# Patient Record
Sex: MALE | Race: ASIAN | NOT HISPANIC OR LATINO | URBAN - METROPOLITAN AREA
[De-identification: names, ages, dates, MRNs, and addresses within clinical notes are randomized per-mention and may not be internally consistent; named-entity substitution may affect disease eponyms.]

---

## 2021-04-09 ENCOUNTER — EMERGENCY (EMERGENCY)
Facility: HOSPITAL | Age: 56
LOS: 1 days | Discharge: HOME | End: 2021-04-09
Attending: EMERGENCY MEDICINE | Admitting: EMERGENCY MEDICINE
Payer: MEDICAID

## 2021-04-09 VITALS
SYSTOLIC BLOOD PRESSURE: 149 MMHG | DIASTOLIC BLOOD PRESSURE: 106 MMHG | TEMPERATURE: 98 F | HEART RATE: 109 BPM | OXYGEN SATURATION: 100 % | RESPIRATION RATE: 16 BRPM

## 2021-04-09 VITALS
OXYGEN SATURATION: 100 % | SYSTOLIC BLOOD PRESSURE: 133 MMHG | HEART RATE: 80 BPM | DIASTOLIC BLOOD PRESSURE: 88 MMHG | RESPIRATION RATE: 16 BRPM

## 2021-04-09 LAB
ALBUMIN SERPL ELPH-MCNC: 4.8 G/DL — SIGNIFICANT CHANGE UP (ref 3.3–5)
ALP SERPL-CCNC: 76 U/L — SIGNIFICANT CHANGE UP (ref 40–120)
ALT FLD-CCNC: 33 U/L — SIGNIFICANT CHANGE UP (ref 4–41)
ANION GAP SERPL CALC-SCNC: 13 MMOL/L — SIGNIFICANT CHANGE UP (ref 7–14)
APPEARANCE UR: CLEAR — SIGNIFICANT CHANGE UP
APTT BLD: 34.5 SEC — SIGNIFICANT CHANGE UP (ref 27–36.3)
AST SERPL-CCNC: 25 U/L — SIGNIFICANT CHANGE UP (ref 4–40)
BACTERIA # UR AUTO: NEGATIVE — SIGNIFICANT CHANGE UP
BASOPHILS # BLD AUTO: 0.05 K/UL — SIGNIFICANT CHANGE UP (ref 0–0.2)
BASOPHILS NFR BLD AUTO: 0.4 % — SIGNIFICANT CHANGE UP (ref 0–2)
BILIRUB SERPL-MCNC: 0.4 MG/DL — SIGNIFICANT CHANGE UP (ref 0.2–1.2)
BILIRUB UR-MCNC: NEGATIVE — SIGNIFICANT CHANGE UP
BUN SERPL-MCNC: 14 MG/DL — SIGNIFICANT CHANGE UP (ref 7–23)
CALCIUM SERPL-MCNC: 9.9 MG/DL — SIGNIFICANT CHANGE UP (ref 8.4–10.5)
CHLORIDE SERPL-SCNC: 103 MMOL/L — SIGNIFICANT CHANGE UP (ref 98–107)
CO2 SERPL-SCNC: 21 MMOL/L — LOW (ref 22–31)
COLOR SPEC: YELLOW — SIGNIFICANT CHANGE UP
CREAT SERPL-MCNC: 0.9 MG/DL — SIGNIFICANT CHANGE UP (ref 0.5–1.3)
DIFF PNL FLD: NEGATIVE — SIGNIFICANT CHANGE UP
EOSINOPHIL # BLD AUTO: 0.17 K/UL — SIGNIFICANT CHANGE UP (ref 0–0.5)
EOSINOPHIL NFR BLD AUTO: 1.3 % — SIGNIFICANT CHANGE UP (ref 0–6)
EPI CELLS # UR: 0 /HPF — SIGNIFICANT CHANGE UP (ref 0–5)
GLUCOSE SERPL-MCNC: 102 MG/DL — HIGH (ref 70–99)
GLUCOSE UR QL: NEGATIVE — SIGNIFICANT CHANGE UP
HCT VFR BLD CALC: 47.6 % — SIGNIFICANT CHANGE UP (ref 39–50)
HGB BLD-MCNC: 15.9 G/DL — SIGNIFICANT CHANGE UP (ref 13–17)
HYALINE CASTS # UR AUTO: 1 /LPF — SIGNIFICANT CHANGE UP (ref 0–7)
IANC: 8.97 K/UL — HIGH (ref 1.5–8.5)
IMM GRANULOCYTES NFR BLD AUTO: 0.4 % — SIGNIFICANT CHANGE UP (ref 0–1.5)
INR BLD: 0.95 RATIO — SIGNIFICANT CHANGE UP (ref 0.88–1.16)
KETONES UR-MCNC: NEGATIVE — SIGNIFICANT CHANGE UP
LEUKOCYTE ESTERASE UR-ACNC: NEGATIVE — SIGNIFICANT CHANGE UP
LYMPHOCYTES # BLD AUTO: 2.89 K/UL — SIGNIFICANT CHANGE UP (ref 1–3.3)
LYMPHOCYTES # BLD AUTO: 22.5 % — SIGNIFICANT CHANGE UP (ref 13–44)
MCHC RBC-ENTMCNC: 26.8 PG — LOW (ref 27–34)
MCHC RBC-ENTMCNC: 33.4 GM/DL — SIGNIFICANT CHANGE UP (ref 32–36)
MCV RBC AUTO: 80.1 FL — SIGNIFICANT CHANGE UP (ref 80–100)
MONOCYTES # BLD AUTO: 0.73 K/UL — SIGNIFICANT CHANGE UP (ref 0–0.9)
MONOCYTES NFR BLD AUTO: 5.7 % — SIGNIFICANT CHANGE UP (ref 2–14)
NEUTROPHILS # BLD AUTO: 8.97 K/UL — HIGH (ref 1.8–7.4)
NEUTROPHILS NFR BLD AUTO: 69.7 % — SIGNIFICANT CHANGE UP (ref 43–77)
NITRITE UR-MCNC: NEGATIVE — SIGNIFICANT CHANGE UP
NRBC # BLD: 0 /100 WBCS — SIGNIFICANT CHANGE UP
NRBC # FLD: 0 K/UL — SIGNIFICANT CHANGE UP
PH UR: 6 — SIGNIFICANT CHANGE UP (ref 5–8)
PLATELET # BLD AUTO: 342 K/UL — SIGNIFICANT CHANGE UP (ref 150–400)
POTASSIUM SERPL-MCNC: 4 MMOL/L — SIGNIFICANT CHANGE UP (ref 3.5–5.3)
POTASSIUM SERPL-SCNC: 4 MMOL/L — SIGNIFICANT CHANGE UP (ref 3.5–5.3)
PROT SERPL-MCNC: 8.2 G/DL — SIGNIFICANT CHANGE UP (ref 6–8.3)
PROT UR-MCNC: ABNORMAL
PROTHROM AB SERPL-ACNC: 11 SEC — SIGNIFICANT CHANGE UP (ref 10.6–13.6)
RBC # BLD: 5.94 M/UL — HIGH (ref 4.2–5.8)
RBC # FLD: 13.2 % — SIGNIFICANT CHANGE UP (ref 10.3–14.5)
RBC CASTS # UR COMP ASSIST: 1 /HPF — SIGNIFICANT CHANGE UP (ref 0–4)
SODIUM SERPL-SCNC: 137 MMOL/L — SIGNIFICANT CHANGE UP (ref 135–145)
SP GR SPEC: 1.02 — SIGNIFICANT CHANGE UP (ref 1.01–1.02)
UROBILINOGEN FLD QL: SIGNIFICANT CHANGE UP
WBC # BLD: 12.86 K/UL — HIGH (ref 3.8–10.5)
WBC # FLD AUTO: 12.86 K/UL — HIGH (ref 3.8–10.5)
WBC UR QL: 1 /HPF — SIGNIFICANT CHANGE UP (ref 0–5)

## 2021-04-09 PROCEDURE — 99285 EMERGENCY DEPT VISIT HI MDM: CPT | Mod: 25

## 2021-04-09 PROCEDURE — 72125 CT NECK SPINE W/O DYE: CPT | Mod: 26

## 2021-04-09 PROCEDURE — 70450 CT HEAD/BRAIN W/O DYE: CPT | Mod: 26

## 2021-04-09 PROCEDURE — 74177 CT ABD & PELVIS W/CONTRAST: CPT | Mod: 26

## 2021-04-09 PROCEDURE — 71260 CT THORAX DX C+: CPT | Mod: 26

## 2021-04-09 PROCEDURE — 93010 ELECTROCARDIOGRAM REPORT: CPT

## 2021-04-09 RX ORDER — CYCLOBENZAPRINE HYDROCHLORIDE 10 MG/1
5 TABLET, FILM COATED ORAL ONCE
Refills: 0 | Status: COMPLETED | OUTPATIENT
Start: 2021-04-09 | End: 2021-04-09

## 2021-04-09 RX ORDER — MORPHINE SULFATE 50 MG/1
4 CAPSULE, EXTENDED RELEASE ORAL ONCE
Refills: 0 | Status: DISCONTINUED | OUTPATIENT
Start: 2021-04-09 | End: 2021-04-09

## 2021-04-09 RX ORDER — SODIUM CHLORIDE 9 MG/ML
1000 INJECTION INTRAMUSCULAR; INTRAVENOUS; SUBCUTANEOUS ONCE
Refills: 0 | Status: COMPLETED | OUTPATIENT
Start: 2021-04-09 | End: 2021-04-09

## 2021-04-09 RX ORDER — IBUPROFEN 200 MG
1 TABLET ORAL
Qty: 20 | Refills: 0
Start: 2021-04-09

## 2021-04-09 RX ORDER — KETOROLAC TROMETHAMINE 30 MG/ML
15 SYRINGE (ML) INJECTION ONCE
Refills: 0 | Status: DISCONTINUED | OUTPATIENT
Start: 2021-04-09 | End: 2021-04-09

## 2021-04-09 RX ORDER — CYCLOBENZAPRINE HYDROCHLORIDE 10 MG/1
1 TABLET, FILM COATED ORAL
Qty: 12 | Refills: 0
Start: 2021-04-09

## 2021-04-09 RX ORDER — LIDOCAINE 4 G/100G
1 CREAM TOPICAL ONCE
Refills: 0 | Status: COMPLETED | OUTPATIENT
Start: 2021-04-09 | End: 2021-04-09

## 2021-04-09 RX ADMIN — MORPHINE SULFATE 4 MILLIGRAM(S): 50 CAPSULE, EXTENDED RELEASE ORAL at 13:12

## 2021-04-09 RX ADMIN — CYCLOBENZAPRINE HYDROCHLORIDE 5 MILLIGRAM(S): 10 TABLET, FILM COATED ORAL at 15:04

## 2021-04-09 RX ADMIN — Medication 15 MILLIGRAM(S): at 15:05

## 2021-04-09 RX ADMIN — SODIUM CHLORIDE 1000 MILLILITER(S): 9 INJECTION INTRAMUSCULAR; INTRAVENOUS; SUBCUTANEOUS at 13:12

## 2021-04-09 RX ADMIN — LIDOCAINE 1 PATCH: 4 CREAM TOPICAL at 15:05

## 2021-04-09 NOTE — ED POST DISCHARGE NOTE - REASON FOR FOLLOW-UP
Noted patient left their incentive spirometry. Called pt and son who said they will come back later today to pick it up. Other

## 2021-04-09 NOTE — ED PROVIDER NOTE - PATIENT PORTAL LINK FT
You can access the FollowMyHealth Patient Portal offered by [facility] by registering at the following website: [email or web address]. By joining [employer]’s FollowMyHealth portal, you will also be able to view your health information using other applications (apps) compatible with our system.

## 2021-04-09 NOTE — ED ADULT NURSE NOTE - INTERVENTIONS DEFINITIONS
Call bell, personal items and telephone within reach/Non-slip footwear when patient is off stretcher/Physically safe environment: no spills, clutter or unnecessary equipment/Stretcher in lowest position, wheels locked, appropriate side rails in place/Provide visual cue, wrist band, yellow gown, etc.

## 2021-04-09 NOTE — ED PROVIDER NOTE - PROGRESS NOTE DETAILS
Dr. Alvarez: Son, Kita, [phone], who was at beside but left was contacted to discuss results. Dr. Alvarez: Discussed results with pt through [employer]  ID: [id number]. Also discussed use of Incentive Spirometry and stressed importance of use. Discussed will also be sending medication to pharmacy to take for pain. Dr. Alvarez: Pt states feeling better. Tolerating PO. Ambulating without any difficulty. Charisse Barnett MD: CT show no fx or ich. Pt well appearing and asymptomatic. Pt is ambulatory and tolerating PO. Spoke with pt about return precautions. Pt agrees to follow up with their PCP. Pt ready for discharge

## 2021-04-09 NOTE — ED PROVIDER NOTE - OBJECTIVE STATEMENT
Pt was offered translation for Persian but requested for his son to translate  55 y/o male with PMHx of DM type 2 and HLD who presented to the ED for chest pain s/p trauma. 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 Pt denies hitting his head or any LOC. Pt notes since having left sided and anterior chest pain Pt was offered translation for Hungarian but requested for his son to translate  55 y/o male with PMHx of DM type 2 and HLD who presented to the ED for chest pain s/p trauma. 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 Pt denies hitting his head or any LOC. Pt notes since having left sided and anterior chest pain. Pt denies any headache, neck pain, back pain, fever, chills, nausea, vomiting, SOB, or abd pain.

## 2021-04-09 NOTE — ED ADULT TRIAGE NOTE - CHIEF COMPLAINT QUOTE
Pt s/p fall this am from approx 12 ft after getting off the ladder to a wooden surface.  Pt stated he fell  on a concrete floor hitting a metal cylinder on the L side, also concrete stone fell on his chest  from 12 ft.  Denies head injury , LOC, or use of blood thinner.  Pt c/o L rib and chest pain.  Pt ambulatory in triage

## 2021-04-09 NOTE — ED PROVIDER NOTE - ATTENDING CONTRIBUTION TO CARE
Pt was offered translation for Azeri but requested for his son to translate  Pt was seen and evaluated by me. Pt is a 57 y/o male with PMHx of DM type 2 and HLD who presented to the ED for chest pain s/p trauma. 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 Pt denies hitting his head or any LOC. Pt notes since having left sided and anterior chest pain. Pt denies any headache, neck pain, back pain, fever, chills, nausea, vomiting, SOB, or abd pain. Lungs CTA b/l. RRR. Abd soft, non-tender. No midline tenderness to neck or back. FROM of b/l UE and LE. + distal pulses. Tender to angles of ribs on left and right anterior chest.  Concern for trauma/rib fractures/r/o pneumothorax/intra-abd injury  Labs, CT, IVF, Analgesia

## 2021-04-09 NOTE — ED PROVIDER NOTE - NSFOLLOWUPINSTRUCTIONS_ED_ALL_ED_FT
Rest and plenty of fluids.  Use Incentive Spirometry 4 times per day as discussed.     May take Motrin 600mg every 6 hours as needed for mild pain. Take with food.  May take Flexeril 5mg every 6 hours as needed for moderate pain. May cause drowsiness.  May take Percocet 1 tab every 6 hours as needed for severe pain. May cause drowsiness.     Return for any worsening symptoms. Rest and plenty of fluids.  Use Incentive Spirometry 4 times per day as discussed.     May take Motrin 600mg every 6 hours as needed for mild pain. Take with food.  May take Flexeril 5mg every 6 hours as needed for moderate pain. May cause drowsiness.  May take Percocet 1 tab every 6 hours as needed for severe pain. May cause drowsiness.     Return for any worsening symptoms.      ???? ?? ??? ??? ?? ?????? 4 ????? ????? ?????? ???? ???? ?????????? ?? ??????? ?????    ???? ??? ?? ? needed ????? ?? ????? ?? 6 ????? ??? ?????? 600mg ?? ???? ??? ????? ?? ???  ?????? ???? ??? ?? ????? ?? ????? ?? 6 ????? ??? ???????? 5 ??? ???? ?? ???? ???? ?????? ?? ???? ?? ???? ???  ???? ??? ?? ????? ?? ????? ?? 6 ????? ??? Percocet 1 ??? ?? ???? ??? ?????? ?? ???? ?? ???? ???    ???? ???? ???? ?????? ?? ?????

## 2021-04-09 NOTE — ED PROVIDER NOTE - CLINICAL SUMMARY MEDICAL DECISION MAKING FREE TEXT BOX
55 y/o male with PMHx of DM type 2 and HLD who presented to the ED for chest pain s/p trauma.   Concern for trauma/rib fractures/r/o pneumothorax/intra-abd injury  Labs, CT, IVF, Analgesia

## 2021-04-09 NOTE — ED PROVIDER NOTE - CARE PLAN
Principal Discharge DX:	Contusion of rib on left side, initial encounter  Secondary Diagnosis:	Fall, initial encounter

## 2021-04-09 NOTE — ED ADULT NURSE NOTE - OBJECTIVE STATEMENT
Pt presents to  4, A&Ox4, ambulatory w/o assistance at baseline, primarily Yoruba speaking- son at bedside interpreting, pmhx of DM & HLD, here for evaluation of fall this morning from 12ft ladder to a concrete floor, pt also states a concrete stone fell on his chest. Pt is c/o left rib and left sided chest pain at this time. Pt denies head injury or loss of consciousness. Pt able to ambulate at this time. Denies shortness of breath, palpitations, diaphoresis, headaches, fevers, dizziness, nausea, vomiting, diarrhea, or urinary symptoms at this time. IV established in right arm with a 20G, labs drawn and sent, call bell in reach, warm blanket provided, bed in lowest position, side rails up x2, MD evaluation in progress. Will continue to monitor.

## 2021-07-04 ENCOUNTER — EMERGENCY (EMERGENCY)
Facility: HOSPITAL | Age: 56
LOS: 1 days | Discharge: HOME | End: 2021-07-04
Admitting: STUDENT IN AN ORGANIZED HEALTH CARE EDUCATION/TRAINING PROGRAM
Payer: MEDICAID

## 2021-07-04 VITALS
DIASTOLIC BLOOD PRESSURE: 88 MMHG | RESPIRATION RATE: 16 BRPM | HEART RATE: 95 BPM | TEMPERATURE: 99 F | SYSTOLIC BLOOD PRESSURE: 146 MMHG | OXYGEN SATURATION: 99 %

## 2021-07-04 PROCEDURE — 99284 EMERGENCY DEPT VISIT MOD MDM: CPT

## 2021-07-04 RX ORDER — IBUPROFEN 200 MG
600 TABLET ORAL ONCE
Refills: 0 | Status: COMPLETED | OUTPATIENT
Start: 2021-07-04 | End: 2021-07-04

## 2021-07-04 RX ADMIN — Medication 600 MILLIGRAM(S): at 18:15

## 2021-07-04 NOTE — ED PROVIDER NOTE - OBJECTIVE STATEMENT
57 y/o male with a hx of HLD presents to the ER c/o 3 months of right lower rear tooth pain worse the last 1 month.  Pt is requesting tooth/bridge be removed.  Pt saw his dentist and referred to a specialist for extraction but has not yet had his appointment.  Pt denies fevers, chills, facial swelling, sob.  H&P obtained via  ID# [id number].

## 2021-07-04 NOTE — ED PROVIDER NOTE - PROGRESS NOTE DETAILS
RACHELE Sanchez: pt feels well ambulating without difficulty.  Discharge reviewed and discussed with patient.  Discharge conducted via  ID# [id number].

## 2021-07-04 NOTE — ED PROVIDER NOTE - NSFOLLOWUPINSTRUCTIONS_ED_ALL_ED_FT
Follow up with your Dentist or call the clinic to schedule an appointment [phone].    Soft diet eat on opposite side of mouth.    Take Ibuprofen 400mg every 6 hours as needed for pain take with food.    Return to the ER for any persistent/worsening or new symptoms swelling, fevers, chills or any concerning symptoms.

## 2021-07-04 NOTE — ED PROVIDER NOTE - CLINICAL SUMMARY MEDICAL DECISION MAKING FREE TEXT BOX
57 y/o male with a hx of HLD presents to the ER c/o 3 months of right lower rear tooth pain worse the last 1 month.  Pt is requesting tooth/bridge be removed.  Pt saw his dentist and referred to a specialist for extraction but has not yet had his appointment. Pt is well appearing, NAD, no sign of infection/abscess, pain control, follow up dental.

## 2021-07-04 NOTE — ED ADULT TRIAGE NOTE - CHIEF COMPLAINT QUOTE
Pt. c/o left bottom tooth pain x 1 month. Son states pt. has a bridge there and would like it removed. No relief with OTC pain meds.

## 2021-07-04 NOTE — ED ADULT NURSE NOTE - OBJECTIVE STATEMENT
Pt presents to ED ambulatory from home with c/o L sided dental pain and swelling x 3 months. Pt denies difficulty speaking or difficulty swallowing.

## 2021-07-05 PROBLEM — E78.5 HYPERLIPIDEMIA, UNSPECIFIED: Chronic | Status: ACTIVE | Noted: 2021-04-09

## 2021-07-05 PROBLEM — I10 ESSENTIAL (PRIMARY) HYPERTENSION: Chronic | Status: ACTIVE | Noted: 2021-04-09

## 2023-01-05 NOTE — ED PROVIDER NOTE - CPE EDP PSYCH NORM
Daily Note     Today's date: 2023  Patient name: Fredis Head  : 1954  MRN: [id number]  Referring provider: Eben Martinez MD  Dx:   Encounter Diagnosis     ICD-10-CM    1  S/P lumbar fusion  Z98 1       2  Status post lumbar spinal fusion  Z98 1                      Subjective: pt reports the shoulder blade pain is still present but less than when he was here last time  Objective: See treatment diary below      Assessment: Tolerated treatment well  Pt dem improvements in static balance, not requiring CGA only CS during FT balance  Pt cont to be challenged with dynamic balance  Pt dem increased soreness in his R side post session, he thinks due to the STS  Cont to progress balance and core/LE strengthenging as tolerated  Patient would benefit from continued PT      Plan: Continue per plan of care        Precautions: PT CANNOT LIE SUPINE - LONGITUDINAL INCISION STILL HEALING  Past Medical History:   Diagnosis Date   • Ambulates with cane     "long distance"   • Arthritis    • Back pain    • Chronic pain disorder    • Dental crowns present    • Exercise involving walking     daily -short walks   • History of hepatitis C     per pt "went away on its own" age 14"   • History of transfusion    • Hyperlipidemia    • Hypertension    • Leg pain     and foot pain   • Limb alert care status     per pt NO IV's LEFT UPPER ARM due to old injury   • Risk for falls    • Spinal stenosis    • Stroke [facility])     per pt in -and no lasting problems   • Wears glasses     readers     SOC: 2022  FOTO: 2022  POC EXP: 3/9/2023  DAILY TREATMENT LOG:  date 2023       Visit #/auth [email or web address]                               Neuro Re-Ed 20'        FT w/ EC CGA 30"x2 CGA       FT w/ EO on foam FT EO w/ head turns 2x10 CS       Step stance balance with EC Tandem walk CGA 2 laps near rail        Sidestep along rail RTB @ knees 3 laps near window [employer] walks along rail  RTB @ nears 3 laps near window CS Alt taps to step To cone CGA 1x10 R/L        FSU near rail CGA 6" step        Retro walk near rail 2 laps near rail CGA        Ther Ex 18'        B/L HR Off step 2x10 CGA       LAQ w/ add  2#        TB hams Blue        Standing step backs        Hip abd sidelylng        Stand hip abd RTB @ knees 2x10 R/L       Stand hip exten  RTB @ knees 2x10 R/L       STS from chair W/ B/L OH press 1# DB's 2x10        Long stepping along rail for LE ROM        B/L UE ext vs tubing yulisa 7# 2x15        Sit modif fig 4 stretch w/ foot on stool 20"x3        B/L UE wall slides flexion 2x10        HEP        Ther Activity                        Gait Training                        Modalities                        Access Code: [email or web address]  URL: [email or web address]  Date: 12/23/2022  Prepared by:  Nehemias Peeling    Exercises  Sidelying Hip Abduction - 1 x daily - 2 sets - 10 reps  Reverse clamshell with resistance - 1 x daily - 2 sets - 10 reps  Seated Long Arc Quad with Hip Adduction - 1 x daily - 2 sets - 10 reps - 3 hold  Seated Hamstring Curl with Anchored Resistance - 1 x daily - 2 sets - 10 reps  Standing Hip Abduction with Resistance at Ankles and Counter Support - 1 x daily - 2 sets - 10 reps  Hip Extension with Resistance Loop - 1 x daily - 2 sets - 10 reps  Side Stepping with Resistance at Ankles - 1 x daily - 3 sets - 10 reps  Standing Heel Raises - 1 x daily - 2 sets - 10 reps  Squat with Chair Touch - 1 x daily - 2 sets - 10 reps normal...

## 2024-04-12 NOTE — ED PROVIDER NOTE - PATIENT PORTAL LINK FT
You can access the FollowMyHealth Patient Portal offered by [facility] by registering at the following website: [email or web address]. By joining [employer]’s FollowMyHealth portal, you will also be able to view your health information using other applications (apps) compatible with our system. 12-Apr-2024 17:12